# Patient Record
Sex: FEMALE | Race: WHITE | Employment: PART TIME | URBAN - METROPOLITAN AREA
[De-identification: names, ages, dates, MRNs, and addresses within clinical notes are randomized per-mention and may not be internally consistent; named-entity substitution may affect disease eponyms.]

---
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
## 2023-01-04 ENCOUNTER — OFFICE VISIT (OUTPATIENT)
Dept: PRIMARY CARE CLINIC | Age: 18
End: 2023-01-04

## 2023-01-04 VITALS
BODY MASS INDEX: 23.54 KG/M2 | TEMPERATURE: 99.6 F | HEART RATE: 89 BPM | SYSTOLIC BLOOD PRESSURE: 121 MMHG | DIASTOLIC BLOOD PRESSURE: 80 MMHG | HEIGHT: 67 IN | WEIGHT: 150 LBS | OXYGEN SATURATION: 99 % | RESPIRATION RATE: 16 BRPM

## 2023-01-04 DIAGNOSIS — Z72.51 HIGH RISK HETEROSEXUAL BEHAVIOR: ICD-10-CM

## 2023-01-04 DIAGNOSIS — R20.0 NUMBNESS AND TINGLING OF BOTH LEGS: ICD-10-CM

## 2023-01-04 DIAGNOSIS — R20.2 NUMBNESS AND TINGLING OF BOTH LEGS: Primary | ICD-10-CM

## 2023-01-04 DIAGNOSIS — R20.0 NUMBNESS AND TINGLING OF BOTH LEGS: Primary | ICD-10-CM

## 2023-01-04 DIAGNOSIS — R20.2 NUMBNESS AND TINGLING OF BOTH LEGS: ICD-10-CM

## 2023-01-04 LAB
ALBUMIN SERPL-MCNC: 4.2 G/DL (ref 3.5–5.2)
ALP BLD-CCNC: 68 U/L (ref 35–104)
ALT SERPL-CCNC: 18 U/L (ref 0–32)
ANION GAP SERPL CALCULATED.3IONS-SCNC: 13 MMOL/L (ref 7–16)
APPEARANCE FLUID: NORMAL
AST SERPL-CCNC: 25 U/L (ref 0–31)
BASOPHILS ABSOLUTE: 0.05 E9/L (ref 0–0.2)
BASOPHILS RELATIVE PERCENT: 0.6 % (ref 0–2)
BILIRUB SERPL-MCNC: 1.1 MG/DL (ref 0–1.2)
BILIRUBIN, POC: NORMAL
BLOOD URINE, POC: NORMAL
BUN BLDV-MCNC: 4 MG/DL (ref 6–20)
CALCIUM SERPL-MCNC: 10.1 MG/DL (ref 8.6–10.2)
CHLORIDE BLD-SCNC: 100 MMOL/L (ref 98–107)
CLARITY, POC: NORMAL
CO2: 23 MMOL/L (ref 22–29)
COLOR, POC: YELLOW
CONTROL: NORMAL
CREAT SERPL-MCNC: 0.7 MG/DL (ref 0.5–1)
EOSINOPHILS ABSOLUTE: 0.01 E9/L (ref 0.05–0.5)
EOSINOPHILS RELATIVE PERCENT: 0.1 % (ref 0–6)
GFR SERPL CREATININE-BSD FRML MDRD: >60 ML/MIN/1.73
GLUCOSE BLD-MCNC: 135 MG/DL (ref 74–99)
GLUCOSE URINE, POC: NORMAL
HCT VFR BLD CALC: 41.9 % (ref 34–48)
HEMOGLOBIN: 13.6 G/DL (ref 11.5–15.5)
IMMATURE GRANULOCYTES #: 0.02 E9/L
IMMATURE GRANULOCYTES %: 0.2 % (ref 0–5)
KETONES, POC: NORMAL
LEUKOCYTE EST, POC: NORMAL
LYMPHOCYTES ABSOLUTE: 1.81 E9/L (ref 1.5–4)
LYMPHOCYTES RELATIVE PERCENT: 20.9 % (ref 20–42)
MAGNESIUM: 2.2 MG/DL (ref 1.6–2.6)
MCH RBC QN AUTO: 28.4 PG (ref 26–35)
MCHC RBC AUTO-ENTMCNC: 32.5 % (ref 32–34.5)
MCV RBC AUTO: 87.5 FL (ref 80–99.9)
MONOCYTES ABSOLUTE: 0.28 E9/L (ref 0.1–0.95)
MONOCYTES RELATIVE PERCENT: 3.2 % (ref 2–12)
NEUTROPHILS ABSOLUTE: 6.47 E9/L (ref 1.8–7.3)
NEUTROPHILS RELATIVE PERCENT: 75 % (ref 43–80)
NITRITE, POC: NORMAL
PDW BLD-RTO: 13 FL (ref 11.5–15)
PH, POC: 7
PLATELET # BLD: 445 E9/L (ref 130–450)
PMV BLD AUTO: 9.9 FL (ref 7–12)
POTASSIUM SERPL-SCNC: 4.2 MMOL/L (ref 3.5–5)
PREGNANCY TEST URINE, POC: NORMAL
PROTEIN, POC: NORMAL
RBC # BLD: 4.79 E12/L (ref 3.5–5.5)
SODIUM BLD-SCNC: 136 MMOL/L (ref 132–146)
SPECIFIC GRAVITY, POC: 1.02
TOTAL PROTEIN: 7.3 G/DL (ref 6.4–8.3)
UROBILINOGEN, POC: NORMAL
WBC # BLD: 8.6 E9/L (ref 4.5–11.5)

## 2023-01-04 NOTE — PROGRESS NOTES
Chief Complaint:   Tingling (Bilateral thigh tingling and slight numbness- ongoing for a couple days- denies any pain, no injury. Patient states it gets worse at night. )    History of Present Illness   HPI:  Jose Pino is a 21 y.o. female who presents to [facility] today for numbness and tingling of bilateral thighs. Patient states this has been ongoing for several days now. She denies any injuries or trauma. States that there is no aggravating factors and nothing that makes it better. States that it just progressively worsened throughout the day. She denies any lower back pain or urinary symptoms. She is having regular bowel movements. She has not had any numbness or tingling anywhere else throughout her body. She states that she has had unprotected intercourse recently approximately 1 week ago with a new partner and she would like to be STD tested to be sure that that cannot be causing the symptoms. She denies any vaginal discharge or vaginal bleeding. She does not have menstrual periods as she does have the Nexplanon implant. Prior to Visit Medications    Medication Sig Taking?  Authorizing Provider   Biotin 10 MG CAPS Take by mouth Yes Historical Provider, MD   cetirizine (ZYRTEC) 10 MG tablet Take 10 mg by mouth daily Yes Historical Provider, MD   fluconazole (DIFLUCAN) 150 MG tablet 1 tab po x 1 dose, may repeat in 72 hrs if still symptomatic  Patient not taking: Reported on 1/4/2023  Chapin Wu APRN - CNP       Review of Systems   Unless otherwise stated in this report or unable to obtain because of the patient's clinical or mental status as evidenced by the medical record, this patients's positive and negative responses for Review of Systems, constitutional, psych, eyes, ENT, cardiovascular, respiratory, gastrointestinal, neurological, genitourinary, musculoskeletal, integument systems and systems related to the presenting problem are either stated in the preceding or were not pertinent or were negative for the symptoms and/or complaints related to the medical problem. Past Medical History:  has no past medical history on file. Past Surgical History:  has no past surgical history on file. Social History:  reports that she has never smoked. She has never used smokeless tobacco.  Family History: family history is not on file. Allergies: Macadamia nut oil and Thimerosal    Physical Exam   Vital Signs:  /80   Pulse 89   Temp 99.6 °F (37.6 °C) (Oral)   Resp 16   Ht 5' 7\" (1.702 m)   Wt 150 lb (68 kg)   SpO2 99%   BMI 23.49 kg/m²    Oxygen Saturation Interpretation: Normal.    Physical Exam  Vitals reviewed. Constitutional:       Appearance: Normal appearance. She is well-developed. She is not toxic-appearing. HENT:      Head: Normocephalic and atraumatic. Right Ear: Hearing normal.      Left Ear: Hearing normal.      Nose: Nose normal.      Mouth/Throat:      Lips: Pink. Mouth: Mucous membranes are moist.      Pharynx: Oropharynx is clear. Uvula midline. Eyes:      General: Lids are normal.      Conjunctiva/sclera: Conjunctivae normal.      Pupils: Pupils are equal, round, and reactive to light. Neck:      Trachea: Trachea normal.   Cardiovascular:      Rate and Rhythm: Normal rate and regular rhythm. Pulses: Normal pulses. Heart sounds: Normal heart sounds. Pulmonary:      Effort: Pulmonary effort is normal.      Breath sounds: Normal breath sounds. Abdominal:      General: Abdomen is flat. Bowel sounds are normal.      Palpations: Abdomen is soft. Musculoskeletal:      Cervical back: Normal range of motion. Legs:       Comments: Slight numbness and tingling to the bilateral medial thighs down to the knee. Lymphadenopathy:      Cervical: No cervical adenopathy. Skin:     General: Skin is warm and dry. Capillary Refill: Capillary refill takes less than 2 seconds.    Neurological:      Mental Status: She is alert and oriented to person, place, and time. Psychiatric:         Attention and Perception: Attention normal.         Mood and Affect: Mood and affect normal.         Speech: Speech normal.         Behavior: Behavior normal. Behavior is cooperative. Thought Content: Thought content normal.         Cognition and Memory: Cognition normal.         Judgment: Judgment normal.       Test Results Section   (All laboratory and radiology results have been personally reviewed by myself)  Labs:  Results for orders placed or performed in visit on 01/04/23   POCT Urinalysis no Micro   Result Value Ref Range    Color, UA yellow     Clarity, UA cloudy     Glucose, UA POC neg     Bilirubin, UA neg     Ketones, UA neg     Spec Grav, UA 1.020     Blood, UA POC neg     pH, UA 7.0     Protein, UA POC trace     Urobilinogen, UA 0.2 E.U     Leukocytes, UA trace     Nitrite, UA neg     Appearance, Fluid     POCT urine pregnancy   Result Value Ref Range    Preg Test, Ur neg     Control          Imaging: All Radiology results interpreted by Radiologist unless otherwise noted. No results found. Medical Decision Making   MDM:   13-year-old female who presents today for numbness and tingling to the medial thighs bilaterally for the last several days. Vital signs reviewed found to be within normal limits. On physical exam she does have decreased sensation slightly to the medial thighs bilaterally. She is also concerned for possible STD causing this since she did have unprotected intercourse recently. Urinalysis in office only significant for trace leukocytes, will send urine for culture and sensitivity, urine pregnancy was negative. Plan for all the above symptoms advised to obtain blood work including CBC, CMP and magnesium. We will also obtain labs for STD screening including HIV, RPR, gonorrhea, chlamydia and trichomonas. She will be advised of these results once available.   She did schedule an appointment with PCP, Dr. Elena Preciado, for 1/23 for further evaluation and treatment.  Patient was advised if her symptoms change or worsen to go to the ER.  Other red flag symptoms were discussed with patient.  Patient verbalized understanding is agreeable with plan of care.  All questions answered.    Assessment / Plan   Impression(s):  Mary Kate was seen today for tingling.    Diagnoses and all orders for this visit:    Numbness and tingling of both legs  -     Comprehensive Metabolic Panel; Future  -     CBC with Auto Differential; Future  -     Magnesium; Future    High risk heterosexual behavior  -     Trichomonas Screen; Future  -     RPR; Future  -     HIV Screen; Future  -     POCT Urinalysis no Micro  -     C.trachomatis N.gonorrhoeae DNA, Urine; Future  -     POCT urine pregnancy  -     Culture, Urine; Future    Discharged home.  Patient condition is good    Return if symptoms worsen or fail to improve.     New Medications     New Prescriptions    No medications on file       Electronically signed by ANDRIY Doshi CNP   DD: 1/4/23    **This report was transcribed using voice recognition software. Every effort was made to ensure accuracy; however, inadvertent computerized transcription errors may be present.

## 2023-01-05 LAB — RPR: NORMAL

## 2023-01-07 LAB
ORGANISM: ABNORMAL
URINE CULTURE, ROUTINE: ABNORMAL
URINE CULTURE, ROUTINE: ABNORMAL

## 2023-01-09 LAB
C. TRACHOMATIS DNA ,URINE: NEGATIVE
CULTURE, TRICHOMONAS: NORMAL
N. GONORRHOEAE DNA, URINE: NEGATIVE
SOURCE: NORMAL

## 2023-02-16 ENCOUNTER — OFFICE VISIT (OUTPATIENT)
Dept: PRIMARY CARE CLINIC | Age: 18
End: 2023-02-16

## 2023-02-16 VITALS
HEIGHT: 67 IN | DIASTOLIC BLOOD PRESSURE: 76 MMHG | SYSTOLIC BLOOD PRESSURE: 110 MMHG | OXYGEN SATURATION: 98 % | WEIGHT: 145 LBS | HEART RATE: 96 BPM | TEMPERATURE: 97.3 F | BODY MASS INDEX: 22.76 KG/M2

## 2023-02-16 DIAGNOSIS — R68.89 COLD SWEAT: ICD-10-CM

## 2023-02-16 DIAGNOSIS — J01.00 ACUTE NON-RECURRENT MAXILLARY SINUSITIS: Primary | ICD-10-CM

## 2023-02-16 LAB
INFLUENZA A ANTIBODY: NEGATIVE
INFLUENZA B ANTIBODY: NEGATIVE
Lab: NORMAL
PERFORMING INSTRUMENT: NORMAL
QC PASS/FAIL: NORMAL
S PYO AG THROAT QL: NORMAL
SARS-COV-2, POC: NORMAL

## 2023-02-16 RX ORDER — AMOXICILLIN 500 MG/1
500 CAPSULE ORAL 3 TIMES DAILY
Qty: 21 CAPSULE | Refills: 0 | Status: SHIPPED | OUTPATIENT
Start: 2023-02-16 | End: 2023-02-23

## 2023-02-16 NOTE — PROGRESS NOTES
Chief Complaint:   Sweats (Sweats & chills began Monday along with sinus congestion, fatigue, headache, and sore throat. States she has been taking Tylenol Cold & Flu OTC with no relief. )    History of Present Illness   Source of history provided by:  patient. Theodora Wray is a 21 y.o. old female who presents to walk-in for evaluation of sinus pressure, nasal congestion, discolored nasal drainage, chills and sore throat x 5 days. Has been taking tylenol cold and flu OTC without relief. Denies any fever, chills, wheezing, CP, SOB, or GI symptoms. Denies any hx of asthma, COPD, or tobacco use. No known sick contacts. Review of Systems   Unless otherwise stated in this report or unable to obtain because of the patient's clinical or mental status as evidenced by the medical record, this patients's positive and negative responses for Review of Systems, constitutional, psych, eyes, ENT, cardiovascular, respiratory, gastrointestinal, neurological, genitourinary, musculoskeletal, integument systems and systems related to the presenting problem are either stated in the preceding or were negative for the symptoms and/or complaints related to the medical problem. Past Medical History:  has no past medical history on file. Past Surgical History:  has no past surgical history on file. Social History:  reports that she has never smoked. She has never used smokeless tobacco.  Family History: family history is not on file. Allergies: Macadamia nut oil and Thimerosal    Physical Exam   Vital Signs:  /76   Pulse 96   Temp 97.3 °F (36.3 °C) (Oral)   Ht 5' 7\" (1.702 m)   Wt 145 lb (65.8 kg)   LMP  (LMP Unknown) Comment: Unknown d/t BC  SpO2 98%   BMI 22.71 kg/m²    Oxygen Saturation Interpretation: Normal.    Constitutional:  Alert, development consistent with age. Head: There is mild TTP over the maxillary sinuses.   Ears: Bilateral pinna normal. TMs clear without erythema or perforation bilaterally. Canals normal bilaterally without swelling or exudate  Nose:  There is mild congestion of the nasal mucosa. There is mild injection to middle turbinates bilaterally. Throat: There is mild posterior pharyngeal erythema with mild post nasal drip present. No exudate or tonsillar hypertrophy noted. Neck:  Supple. There is no anterior cervical adenopathy. Lungs: CTAB without wheezes, rales, or rhonchi  Heart:  Regular rate and rhythm, normal heart sounds, without pathological murmurs, ectopy, gallops, or rubs. Skin:  Normal turgor. Warm, dry, without visible rash. Neurological:  Alert and oriented. Motor functions intact. Responds to verbal commands. Test Results Section   (All laboratory and radiology results have been personally reviewed by myself)  Labs:  Results for orders placed or performed in visit on 02/16/23   POCT rapid strep A   Result Value Ref Range    Strep A Ag None Detected None Detected   POCT Influenza A/B   Result Value Ref Range    Influenza A Ab Negative     Influenza B Ab Negative    POCT COVID-19, Antigen   Result Value Ref Range    SARS-COV-2, POC Not-Detected Not Detected    Lot Number [id number]     QC Pass/Fail pass     Performing Instrument BinaxNOW      Assessment / Plan   Impression(s):  Monica Bejarano was seen today for sweats. Diagnoses and all orders for this visit:    Acute non-recurrent maxillary sinusitis  -     amoxicillin (AMOXIL) 500 MG capsule; Take 1 capsule by mouth 3 times daily for 7 days    Cold sweat  -     POCT rapid strep A  -     POCT Influenza A/B  -     POCT COVID-19, Antigen    Rapid strep, flu and COVID were negative in office, patient was advised results. Script written for Amoxicillin, side effects discussed. Increase fluids and rest. Symptomatic relief discussed. F/u PCP in 5-7 days if symptoms persist. ED sooner if symptoms worsen or change. Red flag symptoms discussed. Patient verbalized understanding and is in agreement with this care plan. All questions answered. Return if symptoms worsen or fail to improve. Electronically signed by ANDRIY Garcia CNP   DD: 2/16/23    **This report was transcribed using voice recognition software. Every effort was made to ensure accuracy; however, inadvertent computerized transcription errors may be present.

## 2023-10-23 ENCOUNTER — TELEPHONE (OUTPATIENT)
Dept: PRIMARY CARE CLINIC | Age: 18
End: 2023-10-23

## 2023-10-23 NOTE — TELEPHONE ENCOUNTER
Received a call from patient's mother (on patient MORENO) who stated she received a bill in the mail for her daughters DNA test & wanted to know why she was charged. I explained we do not do billing in this office but we do not do DNA testing in office to my knowledge. Patient's mother requested I look in the chart to see if this was actually ran. Advised  the only test with DNA in the name was a Gonorrhea & Trichomonas send out. Explained to her that anything done in office is free of charge but because the testing was sent to the lab there may have been a charge. Stated she understood & would need to contact her insurance company as this should have been covered once it was sent out of office. No other concerns at this time.

## 2024-01-31 ENCOUNTER — OFFICE VISIT (OUTPATIENT)
Dept: PRIMARY CARE CLINIC | Age: 19
End: 2024-01-31

## 2024-01-31 VITALS
WEIGHT: 145 LBS | BODY MASS INDEX: 22.76 KG/M2 | DIASTOLIC BLOOD PRESSURE: 78 MMHG | HEIGHT: 67 IN | SYSTOLIC BLOOD PRESSURE: 112 MMHG | HEART RATE: 84 BPM | TEMPERATURE: 98.2 F | RESPIRATION RATE: 16 BRPM | OXYGEN SATURATION: 99 %

## 2024-01-31 DIAGNOSIS — J06.9 UPPER RESPIRATORY INFECTION WITH COUGH AND CONGESTION: Primary | ICD-10-CM

## 2024-01-31 DIAGNOSIS — R05.8 DRY COUGH: ICD-10-CM

## 2024-01-31 LAB
INFLUENZA A ANTIBODY: NEGATIVE
INFLUENZA B ANTIBODY: NEGATIVE
Lab: NORMAL
PERFORMING INSTRUMENT: NORMAL
QC PASS/FAIL: NORMAL
SARS-COV-2, POC: NORMAL

## 2024-01-31 RX ORDER — AMOXICILLIN 875 MG/1
875 TABLET, COATED ORAL 2 TIMES DAILY
Qty: 20 TABLET | Refills: 0 | Status: SHIPPED | OUTPATIENT
Start: 2024-01-31 | End: 2024-02-10

## 2024-01-31 RX ORDER — MULTIVIT WITH CALCIUM,IRON,MIN
TABLET ORAL
COMMUNITY

## 2024-01-31 NOTE — PROGRESS NOTES
Chief Complaint:   Congestion (Sinus pain/pressure, sinus congestion, dry cough since 01/27/2024. Took theraflu/dayquil helps everything but nasal congestion. )    History of Present Illness   Source of history provided by:  patient.    Mary Kate Maradiaga is a 18 y.o. old female who presents to walk-in for sore throat, which began 5 day(s) prior to arrival. The symptoms are associated with nasal congestion, sinus pressure and dry cough.  There has been NO fever, chills, N/V/D, chest pain or SOB. Denies any hx of asthma, COPD, or tobacco use. Taking EmergenC, theraflu, Nyquil and Dayquil with some relief in symptoms.     Review of Systems   Unless otherwise stated in this report or unable to obtain because of the patient's clinical or mental status as evidenced by the medical record, this patients's positive and negative responses for Review of Systems, constitutional, psych, eyes, ENT, cardiovascular, respiratory, gastrointestinal, neurological, genitourinary, musculoskeletal, integument systems and systems related to the presenting problem are either stated in the preceding or were not pertinent or were negative for the symptoms and/or complaints related to the medical problem.    Past Medical History:  has no past medical history on file.   Past Surgical History:  has no past surgical history on file.  Social History:  reports that she has never smoked. She has never used smokeless tobacco.  Family History: family history is not on file.  Allergies: Macadamia nut oil and Thimerosal (thiomersal)    Physical Exam   Vital Signs:  /78 (Site: Right Upper Arm, Position: Sitting, Cuff Size: Medium Adult)   Pulse 84   Temp 98.2 °F (36.8 °C) (Temporal)   Resp 16   Ht 1.702 m (5' 7\")   Wt 65.8 kg (145 lb)   SpO2 99%   BMI 22.71 kg/m²    Oxygen Saturation Interpretation: Normal.    Constitutional:  Alert, development consistent with age.  Ears: Bilateral pinna normal. TMs clear without erythema or perforation

## 2024-04-23 ENCOUNTER — OFFICE VISIT (OUTPATIENT)
Dept: PRIMARY CARE CLINIC | Age: 19
End: 2024-04-23

## 2024-04-23 VITALS
SYSTOLIC BLOOD PRESSURE: 108 MMHG | BODY MASS INDEX: 24.16 KG/M2 | OXYGEN SATURATION: 98 % | RESPIRATION RATE: 16 BRPM | WEIGHT: 145 LBS | DIASTOLIC BLOOD PRESSURE: 74 MMHG | HEART RATE: 98 BPM | TEMPERATURE: 97.4 F | HEIGHT: 65 IN

## 2024-04-23 DIAGNOSIS — R30.0 DYSURIA: ICD-10-CM

## 2024-04-23 DIAGNOSIS — N30.01 ACUTE CYSTITIS WITH HEMATURIA: Primary | ICD-10-CM

## 2024-04-23 LAB
BILIRUBIN, POC: NORMAL
BLOOD URINE, POC: NORMAL
CLARITY, POC: NORMAL
COLOR, POC: NORMAL
CONTROL: NORMAL
GLUCOSE URINE, POC: NORMAL
KETONES, POC: NORMAL
LEUKOCYTE EST, POC: NORMAL
NITRITE, POC: POSITIVE
PH, POC: 6
PREGNANCY TEST URINE, POC: NORMAL
PROTEIN, POC: 300
SPECIFIC GRAVITY, POC: 1.03
UROBILINOGEN, POC: 1

## 2024-04-23 PROCEDURE — 81025 URINE PREGNANCY TEST: CPT | Performed by: NURSE PRACTITIONER

## 2024-04-23 PROCEDURE — 81002 URINALYSIS NONAUTO W/O SCOPE: CPT | Performed by: NURSE PRACTITIONER

## 2024-04-23 PROCEDURE — 99214 OFFICE O/P EST MOD 30 MIN: CPT | Performed by: NURSE PRACTITIONER

## 2024-04-23 RX ORDER — PHENAZOPYRIDINE HYDROCHLORIDE 100 MG/1
100 TABLET, FILM COATED ORAL 3 TIMES DAILY PRN
Qty: 9 TABLET | Refills: 0 | Status: SHIPPED | OUTPATIENT
Start: 2024-04-23 | End: 2024-04-26

## 2024-04-23 RX ORDER — NITROFURANTOIN 25; 75 MG/1; MG/1
100 CAPSULE ORAL 2 TIMES DAILY
Qty: 10 CAPSULE | Refills: 0 | Status: SHIPPED | OUTPATIENT
Start: 2024-04-23 | End: 2024-04-28

## 2024-04-23 NOTE — PROGRESS NOTES
pathologic murmurs, rubs, or gallops.  Abdomen: Soft, nondistended, with mild suprapubic tenderness. No rebound, rigidity, or guarding. BS+ X4. No organomegaly.     Back: No CVA tenderness.  Skin:  Normal turgor.  Warm, dry, without visible rash, unless noted elsewhere.  Neurological:  Alert and oriented.  Motor functions intact.  Responds to verbal commands.    Test Results Section   (All laboratory and radiology results have been personally reviewed by myself)  Labs:  Results for orders placed or performed in visit on 04/23/24   POCT Urinalysis no Micro   Result Value Ref Range    Color, UA red     Clarity, UA red     Glucose, UA POC neg     Bilirubin, UA moderate     Ketones, UA trace     Spec Grav, UA 1.030     Blood, UA POC large     pH, UA 6.0     Protein, UA      Urobilinogen, UA 1.0     Leukocytes, UA trace     Nitrite, UA positive    POCT urine pregnancy   Result Value Ref Range    Preg Test, Ur neg     Control       Imaging:  All Radiology results interpreted by Radiologist unless otherwise noted.  No results found.    Medical Decision Making   Patient is well appearing, non toxic and appropriate for outpatient management.  Plan is for symptom management and PCP follow up.     Assessment / Plan   Impression(s):  1. Acute cystitis with hematuria  - nitrofurantoin, macrocrystal-monohydrate, (MACROBID) 100 MG capsule; Take 1 capsule by mouth 2 times daily for 5 days  Dispense: 10 capsule; Refill: 0  - phenazopyridine (PYRIDIUM) 100 MG tablet; Take 1 tablet by mouth 3 times daily as needed for Pain  Dispense: 9 tablet; Refill: 0  - Culture, Urine    2. Dysuria  - POCT Urinalysis no Micro  - POCT urine pregnancy     Urinalysis in office as above, appears positive for a UTI. Urine pregnancy negative. Urine C&S pending, will call with results once available. Script written for Pyridium and Macrobid, side effects discussed. Increase fluids and rest. F/u PCP in 3-5 days if symptoms persist. ED sooner if

## 2024-04-24 LAB
CULTURE: NO GROWTH
SPECIMEN DESCRIPTION: NORMAL